# Patient Record
Sex: FEMALE | Race: WHITE | NOT HISPANIC OR LATINO | Employment: UNEMPLOYED | URBAN - NONMETROPOLITAN AREA
[De-identification: names, ages, dates, MRNs, and addresses within clinical notes are randomized per-mention and may not be internally consistent; named-entity substitution may affect disease eponyms.]

---
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
## 2018-05-10 LAB — EXTERNAL GROUP B STREP ANTIGEN: NEGATIVE

## 2018-06-03 ENCOUNTER — HOSPITAL ENCOUNTER (INPATIENT)
Facility: HOSPITAL | Age: 18
LOS: 3 days | Discharge: HOME | End: 2018-06-06
Attending: OBSTETRICS & GYNECOLOGY | Admitting: OBSTETRICS & GYNECOLOGY

## 2018-06-03 DIAGNOSIS — Z34.90 PREGNANCY, UNSPECIFIED GESTATIONAL AGE: Primary | ICD-10-CM

## 2018-06-03 LAB
ABO GROUP BLD: NORMAL
BLD GP AB SCN SERPL QL: NEGATIVE
DEPRECATED RDW RBC AUTO: 42.2 FL (ref 37–54)
ERYTHROCYTE [DISTWIDTH] IN BLOOD BY AUTOMATED COUNT: 14.7 % (ref 11.5–14.5)
HCT VFR BLD AUTO: 34.3 % (ref 37–47)
HGB BLD-MCNC: 11 G/DL (ref 12–16)
MCH RBC QN AUTO: 26 PG (ref 27–33)
MCHC RBC AUTO-ENTMCNC: 32.1 G/DL (ref 33–37)
MCV RBC AUTO: 81.1 FL (ref 80–94)
PLATELET # BLD AUTO: 307 10*3/MM3 (ref 130–400)
PMV BLD AUTO: 10.3 FL (ref 6–10)
RBC # BLD AUTO: 4.23 10*6/MM3 (ref 4.2–5.4)
RH BLD: POSITIVE
T&S EXPIRATION DATE: NORMAL
WBC NRBC COR # BLD: 12.94 10*3/MM3 (ref 4.5–12.5)

## 2018-06-03 PROCEDURE — 86901 BLOOD TYPING SEROLOGIC RH(D): CPT | Performed by: OBSTETRICS & GYNECOLOGY

## 2018-06-03 PROCEDURE — 86900 BLOOD TYPING SEROLOGIC ABO: CPT | Performed by: OBSTETRICS & GYNECOLOGY

## 2018-06-03 PROCEDURE — 85027 COMPLETE CBC AUTOMATED: CPT | Performed by: OBSTETRICS & GYNECOLOGY

## 2018-06-03 PROCEDURE — 59025 FETAL NON-STRESS TEST: CPT

## 2018-06-03 PROCEDURE — 36415 COLL VENOUS BLD VENIPUNCTURE: CPT | Performed by: OBSTETRICS & GYNECOLOGY

## 2018-06-03 PROCEDURE — 86850 RBC ANTIBODY SCREEN: CPT | Performed by: OBSTETRICS & GYNECOLOGY

## 2018-06-03 RX ORDER — LIDOCAINE HYDROCHLORIDE 10 MG/ML
5 INJECTION, SOLUTION EPIDURAL; INFILTRATION; INTRACAUDAL; PERINEURAL AS NEEDED
Status: DISCONTINUED | OUTPATIENT
Start: 2018-06-03 | End: 2018-06-04 | Stop reason: HOSPADM

## 2018-06-03 RX ORDER — HYDROXYZINE 50 MG/1
25 TABLET, FILM COATED ORAL 3 TIMES DAILY PRN
Status: DISCONTINUED | OUTPATIENT
Start: 2018-06-03 | End: 2018-06-04

## 2018-06-03 RX ORDER — PRENATAL VIT NO.126/IRON/FOLIC 28MG-0.8MG
1 TABLET ORAL DAILY
Status: ON HOLD | COMMUNITY
End: 2018-06-04

## 2018-06-03 RX ORDER — ONDANSETRON 2 MG/ML
4 INJECTION INTRAMUSCULAR; INTRAVENOUS EVERY 6 HOURS PRN
Status: DISCONTINUED | OUTPATIENT
Start: 2018-06-03 | End: 2018-06-04 | Stop reason: HOSPADM

## 2018-06-03 RX ORDER — ONDANSETRON 4 MG/1
4 TABLET, FILM COATED ORAL EVERY 6 HOURS PRN
Status: DISCONTINUED | OUTPATIENT
Start: 2018-06-03 | End: 2018-06-04 | Stop reason: HOSPADM

## 2018-06-03 RX ORDER — PROMETHAZINE HYDROCHLORIDE 12.5 MG/1
12.5 SUPPOSITORY RECTAL EVERY 6 HOURS PRN
Status: DISCONTINUED | OUTPATIENT
Start: 2018-06-03 | End: 2018-06-04 | Stop reason: HOSPADM

## 2018-06-03 RX ORDER — OXYTOCIN/RINGER'S LACTATE 20/1000 ML
2-30 PLASTIC BAG, INJECTION (ML) INTRAVENOUS
Status: DISCONTINUED | OUTPATIENT
Start: 2018-06-04 | End: 2018-06-04

## 2018-06-03 RX ORDER — BUTORPHANOL TARTRATE 1 MG/ML
1 INJECTION, SOLUTION INTRAMUSCULAR; INTRAVENOUS
Status: DISCONTINUED | OUTPATIENT
Start: 2018-06-03 | End: 2018-06-04

## 2018-06-03 RX ORDER — MAGNESIUM HYDROXIDE 1200 MG/15ML
1000 LIQUID ORAL ONCE AS NEEDED
Status: COMPLETED | OUTPATIENT
Start: 2018-06-03 | End: 2018-06-04

## 2018-06-03 RX ORDER — TERBUTALINE SULFATE 1 MG/ML
0.25 INJECTION, SOLUTION SUBCUTANEOUS AS NEEDED
Status: DISCONTINUED | OUTPATIENT
Start: 2018-06-03 | End: 2018-06-04

## 2018-06-03 RX ORDER — MISOPROSTOL 100 UG/1
25 TABLET ORAL
Status: DISCONTINUED | OUTPATIENT
Start: 2018-06-03 | End: 2018-06-04

## 2018-06-03 RX ORDER — SODIUM CHLORIDE, SODIUM LACTATE, POTASSIUM CHLORIDE, CALCIUM CHLORIDE 600; 310; 30; 20 MG/100ML; MG/100ML; MG/100ML; MG/100ML
125 INJECTION, SOLUTION INTRAVENOUS CONTINUOUS
Status: DISCONTINUED | OUTPATIENT
Start: 2018-06-03 | End: 2018-06-04

## 2018-06-03 RX ORDER — PROMETHAZINE HYDROCHLORIDE 25 MG/1
12.5 TABLET ORAL EVERY 6 HOURS PRN
Status: DISCONTINUED | OUTPATIENT
Start: 2018-06-03 | End: 2018-06-04 | Stop reason: HOSPADM

## 2018-06-03 RX ORDER — SODIUM CHLORIDE 0.9 % (FLUSH) 0.9 %
1-10 SYRINGE (ML) INJECTION AS NEEDED
Status: DISCONTINUED | OUTPATIENT
Start: 2018-06-03 | End: 2018-06-04 | Stop reason: HOSPADM

## 2018-06-03 RX ORDER — PROMETHAZINE HYDROCHLORIDE 25 MG/ML
12.5 INJECTION, SOLUTION INTRAMUSCULAR; INTRAVENOUS EVERY 6 HOURS PRN
Status: DISCONTINUED | OUTPATIENT
Start: 2018-06-03 | End: 2018-06-04 | Stop reason: HOSPADM

## 2018-06-03 RX ORDER — ONDANSETRON 4 MG/1
4 TABLET, ORALLY DISINTEGRATING ORAL EVERY 6 HOURS PRN
Status: DISCONTINUED | OUTPATIENT
Start: 2018-06-03 | End: 2018-06-04 | Stop reason: HOSPADM

## 2018-06-03 RX ADMIN — MISOPROSTOL 25 MCG: 100 TABLET ORAL at 22:59

## 2018-06-03 RX ADMIN — HYDROXYZINE HYDROCHLORIDE 25 MG: 50 TABLET ORAL at 23:46

## 2018-06-03 RX ADMIN — SODIUM CHLORIDE, POTASSIUM CHLORIDE, SODIUM LACTATE AND CALCIUM CHLORIDE 125 ML/HR: 600; 310; 30; 20 INJECTION, SOLUTION INTRAVENOUS at 22:56

## 2018-06-04 ENCOUNTER — ANESTHESIA EVENT (OUTPATIENT)
Dept: LABOR AND DELIVERY | Facility: HOSPITAL | Age: 18
End: 2018-06-04

## 2018-06-04 ENCOUNTER — ANESTHESIA (OUTPATIENT)
Dept: LABOR AND DELIVERY | Facility: HOSPITAL | Age: 18
End: 2018-06-04

## 2018-06-04 LAB
ABO GROUP BLD: NORMAL
RH BLD: POSITIVE

## 2018-06-04 PROCEDURE — C1755 CATHETER, INTRASPINAL: HCPCS

## 2018-06-04 PROCEDURE — 86901 BLOOD TYPING SEROLOGIC RH(D): CPT

## 2018-06-04 PROCEDURE — 25010000002 TERBUTALINE PER 1 MG: Performed by: OBSTETRICS & GYNECOLOGY

## 2018-06-04 PROCEDURE — C1755 CATHETER, INTRASPINAL: HCPCS | Performed by: ANESTHESIOLOGY

## 2018-06-04 PROCEDURE — 25010000002 BUTORPHANOL PER 1 MG: Performed by: OBSTETRICS & GYNECOLOGY

## 2018-06-04 PROCEDURE — 86900 BLOOD TYPING SEROLOGIC ABO: CPT

## 2018-06-04 RX ORDER — ROPIVACAINE HYDROCHLORIDE 2 MG/ML
INJECTION, SOLUTION EPIDURAL; INFILTRATION; PERINEURAL
Status: DISCONTINUED
Start: 2018-06-04 | End: 2018-06-06 | Stop reason: HOSPADM

## 2018-06-04 RX ORDER — SODIUM CHLORIDE 0.9 % (FLUSH) 0.9 %
1-10 SYRINGE (ML) INJECTION AS NEEDED
Status: DISCONTINUED | OUTPATIENT
Start: 2018-06-04 | End: 2018-06-06 | Stop reason: HOSPADM

## 2018-06-04 RX ORDER — BISACODYL 10 MG
10 SUPPOSITORY, RECTAL RECTAL DAILY PRN
Status: DISCONTINUED | OUTPATIENT
Start: 2018-06-05 | End: 2018-06-06 | Stop reason: HOSPADM

## 2018-06-04 RX ORDER — DOCUSATE SODIUM 100 MG/1
100 CAPSULE, LIQUID FILLED ORAL 2 TIMES DAILY
Status: DISCONTINUED | OUTPATIENT
Start: 2018-06-04 | End: 2018-06-06 | Stop reason: HOSPADM

## 2018-06-04 RX ORDER — ONDANSETRON 4 MG/1
4 TABLET, FILM COATED ORAL EVERY 8 HOURS PRN
Status: DISCONTINUED | OUTPATIENT
Start: 2018-06-04 | End: 2018-06-06 | Stop reason: HOSPADM

## 2018-06-04 RX ORDER — OXYTOCIN/RINGER'S LACTATE 20/1000 ML
2 PLASTIC BAG, INJECTION (ML) INTRAVENOUS CONTINUOUS
Status: DISCONTINUED | OUTPATIENT
Start: 2018-06-04 | End: 2018-06-04

## 2018-06-04 RX ORDER — FAMOTIDINE 10 MG/ML
20 INJECTION, SOLUTION INTRAVENOUS ONCE AS NEEDED
Status: DISCONTINUED | OUTPATIENT
Start: 2018-06-04 | End: 2018-06-04 | Stop reason: HOSPADM

## 2018-06-04 RX ORDER — CARBOPROST TROMETHAMINE 250 UG/ML
250 INJECTION, SOLUTION INTRAMUSCULAR AS NEEDED
Status: DISCONTINUED | OUTPATIENT
Start: 2018-06-04 | End: 2018-06-04 | Stop reason: HOSPADM

## 2018-06-04 RX ORDER — IBUPROFEN 600 MG/1
600 TABLET ORAL EVERY 8 HOURS PRN
Status: DISCONTINUED | OUTPATIENT
Start: 2018-06-04 | End: 2018-06-04

## 2018-06-04 RX ORDER — ONDANSETRON 2 MG/ML
4 INJECTION INTRAMUSCULAR; INTRAVENOUS EVERY 6 HOURS PRN
Status: DISCONTINUED | OUTPATIENT
Start: 2018-06-04 | End: 2018-06-06 | Stop reason: HOSPADM

## 2018-06-04 RX ORDER — HYDROCODONE BITARTRATE AND ACETAMINOPHEN 5; 325 MG/1; MG/1
1 TABLET ORAL EVERY 4 HOURS PRN
Status: DISCONTINUED | OUTPATIENT
Start: 2018-06-04 | End: 2018-06-06 | Stop reason: HOSPADM

## 2018-06-04 RX ORDER — IBUPROFEN 800 MG/1
800 TABLET ORAL EVERY 8 HOURS SCHEDULED
Status: DISCONTINUED | OUTPATIENT
Start: 2018-06-04 | End: 2018-06-06 | Stop reason: HOSPADM

## 2018-06-04 RX ORDER — BUPIVACAINE HYDROCHLORIDE 2.5 MG/ML
INJECTION, SOLUTION EPIDURAL; INFILTRATION; INTRACAUDAL
Status: DISCONTINUED
Start: 2018-06-04 | End: 2018-06-06 | Stop reason: HOSPADM

## 2018-06-04 RX ORDER — MISOPROSTOL 100 UG/1
800 TABLET ORAL AS NEEDED
Status: DISCONTINUED | OUTPATIENT
Start: 2018-06-04 | End: 2018-06-04 | Stop reason: HOSPADM

## 2018-06-04 RX ORDER — ROPIVACAINE HYDROCHLORIDE 2 MG/ML
14 INJECTION, SOLUTION EPIDURAL; INFILTRATION; PERINEURAL CONTINUOUS
Status: DISCONTINUED | OUTPATIENT
Start: 2018-06-04 | End: 2018-06-04

## 2018-06-04 RX ORDER — GLYCERIN/PROPYLENE GLYCOL/WATR
1 SOLUTION, NON-ORAL VAGINAL AS NEEDED
Status: DISCONTINUED | OUTPATIENT
Start: 2018-06-04 | End: 2018-06-04

## 2018-06-04 RX ORDER — DOCUSATE SODIUM 100 MG/1
100 CAPSULE, LIQUID FILLED ORAL DAILY
Status: DISCONTINUED | OUTPATIENT
Start: 2018-06-04 | End: 2018-06-04

## 2018-06-04 RX ORDER — METHYLERGONOVINE MALEATE 0.2 MG/ML
200 INJECTION INTRAVENOUS ONCE AS NEEDED
Status: DISCONTINUED | OUTPATIENT
Start: 2018-06-04 | End: 2018-06-04 | Stop reason: HOSPADM

## 2018-06-04 RX ORDER — GLYCERIN/PROPYLENE GLYCOL/WATR
SOLUTION, NON-ORAL VAGINAL
Status: COMPLETED
Start: 2018-06-04 | End: 2018-06-04

## 2018-06-04 RX ORDER — ZOLPIDEM TARTRATE 5 MG/1
5 TABLET ORAL NIGHTLY PRN
Status: DISCONTINUED | OUTPATIENT
Start: 2018-06-04 | End: 2018-06-06 | Stop reason: HOSPADM

## 2018-06-04 RX ORDER — LANOLIN 100 %
OINTMENT (GRAM) TOPICAL
Status: DISCONTINUED | OUTPATIENT
Start: 2018-06-04 | End: 2018-06-06 | Stop reason: HOSPADM

## 2018-06-04 RX ORDER — OXYTOCIN/RINGER'S LACTATE 20/1000 ML
125 PLASTIC BAG, INJECTION (ML) INTRAVENOUS CONTINUOUS
Status: DISCONTINUED | OUTPATIENT
Start: 2018-06-04 | End: 2018-06-05

## 2018-06-04 RX ORDER — ONDANSETRON 2 MG/ML
4 INJECTION INTRAMUSCULAR; INTRAVENOUS ONCE AS NEEDED
Status: DISCONTINUED | OUTPATIENT
Start: 2018-06-04 | End: 2018-06-04 | Stop reason: HOSPADM

## 2018-06-04 RX ORDER — ACETAMINOPHEN 325 MG/1
650 TABLET ORAL EVERY 4 HOURS PRN
Status: DISCONTINUED | OUTPATIENT
Start: 2018-06-04 | End: 2018-06-06 | Stop reason: HOSPADM

## 2018-06-04 RX ORDER — MISOPROSTOL 100 UG/1
TABLET ORAL
Status: COMPLETED
Start: 2018-06-04 | End: 2018-06-04

## 2018-06-04 RX ORDER — EPHEDRINE SULFATE 50 MG/ML
10 INJECTION, SOLUTION INTRAVENOUS
Status: DISCONTINUED | OUTPATIENT
Start: 2018-06-04 | End: 2018-06-04

## 2018-06-04 RX ADMIN — MISOPROSTOL 25 MCG: 100 TABLET ORAL at 02:17

## 2018-06-04 RX ADMIN — HYDROCODONE BITARTRATE AND ACETAMINOPHEN 1 TABLET: 5; 325 TABLET ORAL at 17:27

## 2018-06-04 RX ADMIN — PRAMOXINE HYDROCHLORIDE AND HYDROCORTISONE ACETATE: 100; 100 AEROSOL, FOAM TOPICAL at 17:29

## 2018-06-04 RX ADMIN — OXYTOCIN 2 MILLI-UNITS/MIN: 10 INJECTION INTRAVENOUS at 07:25

## 2018-06-04 RX ADMIN — SODIUM CHLORIDE, POTASSIUM CHLORIDE, SODIUM LACTATE AND CALCIUM CHLORIDE 1000 ML: 600; 310; 30; 20 INJECTION, SOLUTION INTRAVENOUS at 08:58

## 2018-06-04 RX ADMIN — SODIUM CHLORIDE 1000 ML: 900 IRRIGANT IRRIGATION at 15:09

## 2018-06-04 RX ADMIN — SODIUM CHLORIDE, POTASSIUM CHLORIDE, SODIUM LACTATE AND CALCIUM CHLORIDE 125 ML/HR: 600; 310; 30; 20 INJECTION, SOLUTION INTRAVENOUS at 03:04

## 2018-06-04 RX ADMIN — Medication: at 15:08

## 2018-06-04 RX ADMIN — BENZOCAINE AND LEVOMENTHOL: 200; 5 SPRAY TOPICAL at 17:28

## 2018-06-04 RX ADMIN — IBUPROFEN 800 MG: 800 TABLET ORAL at 21:06

## 2018-06-04 RX ADMIN — TERBUTALINE SULFATE 0.25 MG: 1 INJECTION SUBCUTANEOUS at 04:58

## 2018-06-04 RX ADMIN — BUTORPHANOL TARTRATE 2 MG: 2 INJECTION, SOLUTION INTRAMUSCULAR; INTRAVENOUS at 02:50

## 2018-06-04 RX ADMIN — HYDROCODONE BITARTRATE AND ACETAMINOPHEN 1 TABLET: 5; 325 TABLET ORAL at 21:06

## 2018-06-04 RX ADMIN — Medication: at 17:27

## 2018-06-04 NOTE — ANESTHESIA PROCEDURE NOTES
Labor Epidural    Patient location during procedure: OB  Start Time: 6/4/2018 8:49 AM  Stop Time: 6/4/2018 8:57 AM  Indication:at surgeon's request  Preanesthetic Checklist  Completed: patient identified, site marked, surgical consent, pre-op evaluation, timeout performed, IV checked, risks and benefits discussed and monitors and equipment checked  Prep:  Pt Position:sitting  Sterile Tech:gloves, mask, sterile barrier and cap  Prep:povidone-iodine 7.5% surgical scrub  Monitoring:blood pressure monitoring  Epidural Block Procedure:  Approach:midline  Guidance:landmark technique and palpation technique  Location:L3-L4  Needle Type:Tuohy  Needle Gauge:18 G  Loss of Resistance: 6cm  Cath Depth at skin:8 cm  Paresthesia: none  Aspiration:negative  Test Dose:negative  Number of Attempts: 1  Post Assessment:  Dressing:occlusive dressing applied and secured with tape  Pt Tolerance:patient tolerated the procedure well with no apparent complications  Complications:no

## 2018-06-04 NOTE — PLAN OF CARE
Problem: Patient Care Overview  Goal: Plan of Care Review  Outcome: Ongoing (interventions implemented as appropriate)   06/04/18 3500   Plan of Care Review   Progress improving   Coping/Psychosocial   Plan of Care Reviewed With patient   OTHER   Outcome Summary patient is doing well, tolerating reg diet, prn meds for pain     Goal: Individualization and Mutuality  Outcome: Ongoing (interventions implemented as appropriate)    Goal: Discharge Needs Assessment  Outcome: Ongoing (interventions implemented as appropriate)      Problem: Postpartum (Vaginal Delivery) (Adult,Obstetrics,Pediatric)  Goal: Signs and Symptoms of Listed Potential Problems Will be Absent, Minimized or Managed (Postpartum)  Outcome: Ongoing (interventions implemented as appropriate)

## 2018-06-04 NOTE — H&P
HISTORY    Chief complaint  Here for induction    History of present illness  This is a 17 year old  patient at term for induction and delivery.    Past medical, surgical, obstetrical history  See attached prenatal record    Family history  See attached prenatal record    Social history  See attached prenatal record    Functional inquiry  See attached prenatal record                             PHYSICAL EXAMINATION    General  In no acute distress    HEENT  Throat and ears are clear, no masses or nodes were palpable    CVR  Heart sounds are normal with no murmurs, chest is clear to IPPA    GI/  Abdomen is soft with no masses tenderness or organomegaly.  Uterine size is commensurate with her dates.  Cervix is as noted elsewhere in chart    MS  No gross bone or joint abnormalities.                                        IMPRESSION    Term pregnancy for induction and delivery.    TREATMENT PLAN    I ruptured her membranes for clear amniotic fluid.  Her cervix was 2-3, 50%, -2.  We have Pitocin running.  Expect spontaneous vaginal delivery.

## 2018-06-04 NOTE — ANESTHESIA PREPROCEDURE EVALUATION
Anesthesia Evaluation     Patient summary reviewed and Nursing notes reviewed   no history of anesthetic complications:  NPO Solid Status: > 8 hours  NPO Liquid Status: > 8 hours           Airway   Mallampati: II  TM distance: >3 FB  Neck ROM: full  No difficulty expected  Dental - normal exam     Pulmonary - negative pulmonary ROS and normal exam   Cardiovascular - negative cardio ROS and normal exam        Neuro/Psych- negative ROS  GI/Hepatic/Renal/Endo - negative ROS     Musculoskeletal (-) negative ROS    Abdominal  - normal exam   Substance History - negative use     OB/GYN negative ob/gyn ROS         Other                        Anesthesia Plan    ASA 2     epidural     Anesthetic plan and risks discussed with patient.

## 2018-06-04 NOTE — L&D DELIVERY NOTE
Vaginal Delivery Procedure Note    Kike Fink  Gestational Age: <None> .        OBGYN: Rashad Gomez MD      Pre-op Diagnosis: Complete Dilation      Anesthesia: Epidual        Detailed Description of Procedure   This patient came to the hospital this morning for induction and delivery.  She went on to deliver a female child.  When she was pushing, she kept raising her bottom up in the air, effectively closing her birth canal.  We could not explain to her how to push without doing that, and she was making no progress.  The baby was on the perineum, so I made a midline episiotomy and did an outlet vacuum.  We pulled over 2 contractions, with minimal traction.  The placenta delivered intact.  Uterus contracted well.  I could see her sphincter but it was intact.  I sewed the vaginal mucosa and put in my crown stitch and put my finger in the perineum, and surprisingly, I did go through into the rectum.  This patient had a hole into her rectum but her sphincter was intact.  In any case, I closed the hole in the rectal mucosa with 2 layers of chromic.  I then repaired the episiotomy in the usual fashion with 2-0 chromic. Father, mother, and baby are all stable in the birthing room at this time.             Disposition: [facility]  Condition: Stable    Rashad Gomez M.D     Date: 6/4/2018  Time: 3:22 PM

## 2018-06-04 NOTE — PLAN OF CARE
Problem: Patient Care Overview  Goal: Plan of Care Review  Outcome: Ongoing (interventions implemented as appropriate)    Goal: Individualization and Mutuality  Outcome: Ongoing (interventions implemented as appropriate)    Goal: Discharge Needs Assessment  Outcome: Ongoing (interventions implemented as appropriate)    Goal: Interprofessional Rounds/Family Conf  Outcome: Ongoing (interventions implemented as appropriate)      Problem: Labor (Cervical Ripen, Induct, Augment) (Adult,Obstetrics,Pediatric)  Goal: Signs and Symptoms of Listed Potential Problems Will be Absent, Minimized or Managed (Labor)  Outcome: Ongoing (interventions implemented as appropriate)

## 2018-06-05 LAB
BASOPHILS # BLD AUTO: 0.01 10*3/MM3 (ref 0–0.3)
BASOPHILS NFR BLD AUTO: 0.1 % (ref 0–2)
DEPRECATED RDW RBC AUTO: 45.9 FL (ref 37–54)
EOSINOPHIL # BLD AUTO: 0.12 10*3/MM3 (ref 0–0.7)
EOSINOPHIL NFR BLD AUTO: 1 % (ref 0–5)
ERYTHROCYTE [DISTWIDTH] IN BLOOD BY AUTOMATED COUNT: 15.3 % (ref 11.5–14.5)
HCT VFR BLD AUTO: 30.7 % (ref 37–47)
HGB BLD-MCNC: 9.3 G/DL (ref 12–16)
IMM GRANULOCYTES # BLD: 0.06 10*3/MM3 (ref 0–0.03)
IMM GRANULOCYTES NFR BLD: 0.5 % (ref 0–0.5)
LYMPHOCYTES # BLD AUTO: 2.23 10*3/MM3 (ref 1–3)
LYMPHOCYTES NFR BLD AUTO: 17.7 % (ref 21–51)
MCH RBC QN AUTO: 25.1 PG (ref 27–33)
MCHC RBC AUTO-ENTMCNC: 30.3 G/DL (ref 33–37)
MCV RBC AUTO: 82.7 FL (ref 80–94)
MONOCYTES # BLD AUTO: 1.06 10*3/MM3 (ref 0.1–0.9)
MONOCYTES NFR BLD AUTO: 8.4 % (ref 0–10)
NEUTROPHILS # BLD AUTO: 9.11 10*3/MM3 (ref 1.4–6.5)
NEUTROPHILS NFR BLD AUTO: 72.3 % (ref 30–70)
PLATELET # BLD AUTO: 250 10*3/MM3 (ref 130–400)
PMV BLD AUTO: 10.6 FL (ref 6–10)
RBC # BLD AUTO: 3.71 10*6/MM3 (ref 4.2–5.4)
WBC NRBC COR # BLD: 12.59 10*3/MM3 (ref 4.5–12.5)

## 2018-06-05 PROCEDURE — 85025 COMPLETE CBC W/AUTO DIFF WBC: CPT | Performed by: OBSTETRICS & GYNECOLOGY

## 2018-06-05 RX ADMIN — PRAMOXINE HYDROCHLORIDE AND HYDROCORTISONE ACETATE: 100; 100 AEROSOL, FOAM TOPICAL at 22:02

## 2018-06-05 RX ADMIN — HYDROCODONE BITARTRATE AND ACETAMINOPHEN 1 TABLET: 5; 325 TABLET ORAL at 05:45

## 2018-06-05 RX ADMIN — DOCUSATE SODIUM 100 MG: 100 CAPSULE, LIQUID FILLED ORAL at 08:25

## 2018-06-05 RX ADMIN — IBUPROFEN 800 MG: 800 TABLET ORAL at 05:46

## 2018-06-05 RX ADMIN — IBUPROFEN 800 MG: 800 TABLET ORAL at 22:02

## 2018-06-05 RX ADMIN — IBUPROFEN 800 MG: 800 TABLET ORAL at 14:12

## 2018-06-05 RX ADMIN — PRAMOXINE HYDROCHLORIDE AND HYDROCORTISONE ACETATE: 100; 100 AEROSOL, FOAM TOPICAL at 14:12

## 2018-06-05 RX ADMIN — DOCUSATE SODIUM 100 MG: 100 CAPSULE, LIQUID FILLED ORAL at 22:02

## 2018-06-05 RX ADMIN — HYDROCODONE BITARTRATE AND ACETAMINOPHEN 1 TABLET: 5; 325 TABLET ORAL at 22:02

## 2018-06-05 NOTE — PLAN OF CARE
Problem: Patient Care Overview  Goal: Plan of Care Review   06/04/18 1736 06/04/18 2000   Plan of Care Review   Progress improving --    Coping/Psychosocial   Plan of Care Reviewed With --  patient   OTHER   Outcome Summary patient is doing well, tolerating reg diet, prn meds for pain --      Goal: Individualization and Mutuality  Outcome: Ongoing (interventions implemented as appropriate)      Problem: Postpartum (Vaginal Delivery) (Adult,Obstetrics,Pediatric)  Goal: Signs and Symptoms of Listed Potential Problems Will be Absent, Minimized or Managed (Postpartum)  Outcome: Ongoing (interventions implemented as appropriate)   06/04/18 1736   Goal/Outcome Evaluation   Problems Assessed (Postpartum Vaginal Delivery) all   Problems Present (Postpartum Vag Deliv) none

## 2018-06-05 NOTE — PLAN OF CARE
Problem: Patient Care Overview  Goal: Plan of Care Review  Outcome: Ongoing (interventions implemented as appropriate)   06/05/18 1315   Plan of Care Review   Progress improving   Coping/Psychosocial   Plan of Care Reviewed With patient   OTHER   Outcome Summary patient is doing well, firm, small locia, motrin for pain     Goal: Individualization and Mutuality  Outcome: Ongoing (interventions implemented as appropriate)    Goal: Discharge Needs Assessment  Outcome: Ongoing (interventions implemented as appropriate)      Problem: Postpartum (Vaginal Delivery) (Adult,Obstetrics,Pediatric)  Goal: Signs and Symptoms of Listed Potential Problems Will be Absent, Minimized or Managed (Postpartum)  Outcome: Ongoing (interventions implemented as appropriate)

## 2018-06-05 NOTE — PLAN OF CARE
Problem: Patient Care Overview  Goal: Plan of Care Review  Outcome: Ongoing (interventions implemented as appropriate)   06/05/18 4851   Plan of Care Review   Progress improving   Coping/Psychosocial   Plan of Care Reviewed With mother     Goal: Individualization and Mutuality  Outcome: Ongoing (interventions implemented as appropriate)    Goal: Discharge Needs Assessment  Outcome: Ongoing (interventions implemented as appropriate)      Problem: Postpartum (Vaginal Delivery) (Adult,Obstetrics,Pediatric)  Goal: Signs and Symptoms of Listed Potential Problems Will be Absent, Minimized or Managed (Postpartum)  Outcome: Ongoing (interventions implemented as appropriate)

## 2018-06-06 VITALS
HEIGHT: 67 IN | DIASTOLIC BLOOD PRESSURE: 69 MMHG | WEIGHT: 209 LBS | RESPIRATION RATE: 20 BRPM | OXYGEN SATURATION: 99 % | BODY MASS INDEX: 32.8 KG/M2 | TEMPERATURE: 98.2 F | HEART RATE: 91 BPM | SYSTOLIC BLOOD PRESSURE: 139 MMHG

## 2018-06-06 PROBLEM — Z34.90 PREGNANCY: Status: RESOLVED | Noted: 2018-06-03 | Resolved: 2018-06-06

## 2018-06-06 PROBLEM — Z87.59 STATUS POST VACUUM-ASSISTED VAGINAL DELIVERY: Status: ACTIVE | Noted: 2018-06-06

## 2018-06-06 RX ORDER — IBUPROFEN 600 MG/1
600 TABLET ORAL EVERY 8 HOURS SCHEDULED
Qty: 40 TABLET | Refills: 1 | Status: SHIPPED | OUTPATIENT
Start: 2018-06-06

## 2018-06-06 RX ADMIN — IBUPROFEN 800 MG: 800 TABLET ORAL at 15:05

## 2018-06-06 RX ADMIN — HYDROCODONE BITARTRATE AND ACETAMINOPHEN 1 TABLET: 5; 325 TABLET ORAL at 06:21

## 2018-06-06 RX ADMIN — IBUPROFEN 800 MG: 800 TABLET ORAL at 06:21

## 2018-06-06 RX ADMIN — DOCUSATE SODIUM 100 MG: 100 CAPSULE, LIQUID FILLED ORAL at 08:15

## 2018-06-06 NOTE — PROGRESS NOTES
" [place]  Vaginal Delivery Progress Note    Subjective   Subjective  Postpartum Day 2: Vaginal Delivery    The patient feels well.  Her pain is well controlled with nonsteroidal anti-inflammatory drugs.   She is ambulating well.  Patient describes her bleeding as moderate lochia.    Breastfeeding: infant latching.    Objective     Objective   Vital Signs Range for the last 24 hours  Temperature: Temp:  [98 °F (36.7 °C)-98.2 °F (36.8 °C)] 98.2 °F (36.8 °C)   Temp Source: Temp src: Oral   BP: BP: (120-139)/(69-80) 139/69   Pulse: Heart Rate:  [82-91] 91   Respirations: Resp:  [18-20] 20   Weight:       Admit Height:  Height: 170.2 cm (67\")    Physical Exam:  General:  no acute distresss.  Abdomen: Fundus: appropriate, firm, non tender  Extremities: normal, atraumatic, no cyanosis, and trace edema.       [unfilled]       Lab Results   Component Value Date    ABO O 06/03/2018    RH Positive 06/03/2018        Lab Results   Component Value Date    HGB 9.3 (L) 06/05/2018    HCT 30.7 (L) 06/05/2018         Assessment/Plan   Assessment & Plan  Active Problems:    Pregnancy    Status post vacuum-assisted vaginal delivery    Postpartum care following vaginal delivery      Kike Fink is Day 2  post-partum  Vaginal, Vacuum (Extractor)    .      Plan:  Discharge home with standard precautions and return to clinic in 3 weeks.      NIKOLE Ulrich  6/6/2018  8:56 AM    "

## 2018-06-06 NOTE — PLAN OF CARE
Problem: Patient Care Overview  Goal: Plan of Care Review  Outcome: Ongoing (interventions implemented as appropriate)   06/05/18 1744 06/05/18 1804 06/05/18 2000   Plan of Care Review   Progress --  improving --    Coping/Psychosocial   Plan of Care Reviewed With --  --  patient   OTHER   Outcome Summary patient is doing well, firm, small locia, motrin for pain --  --      Goal: Individualization and Mutuality  Outcome: Ongoing (interventions implemented as appropriate)      Problem: Postpartum (Vaginal Delivery) (Adult,Obstetrics,Pediatric)  Goal: Signs and Symptoms of Listed Potential Problems Will be Absent, Minimized or Managed (Postpartum)  Outcome: Ongoing (interventions implemented as appropriate)   06/05/18 1804   Goal/Outcome Evaluation   Problems Assessed (Postpartum Vaginal Delivery) all   Problems Present (Postpartum Vag Deliv) none

## 2018-06-06 NOTE — DISCHARGE SUMMARY
[facility]  Delivery Discharge Summary    Primary OB Clinician:     EDC: Estimated Date of Delivery: 6/10/18    Gestational Age:39w1d    Antepartum complications: none    Date of Delivery: 2018   Time of Delivery: 2:56 PM     Delivered By:  Rashad Gomez     Delivery Type: Vaginal, Vacuum (Extractor)      Baby: Female  Apgar:  8   @ 1 minute /   Apgar:  9   @ 5 minutes   Weight: 8lb 3.5oz    Anesthesia: Epidural      Intrapartum complications: None    Laceration: No    Episiotomy: Yes     Placenta: Spontaneous     Feeding method: Breastfeeding Status: Yes    [unfilled]       Lab Results   Component Value Date    ABO O 2018    RH Positive 2018        Lab Results   Component Value Date    HGB 9.3 (L) 2018    HCT 30.7 (L) 2018       Rh Immune globulin given: not applicable      Discharge Date: 2018; Discharge Time: 8:58 AM        Plan:    Address and phone number verified and same.  Follow-up appointment with  in 3 weeks.      NIKOLE Ulrich  2018  8:57 AM